# Patient Record
Sex: FEMALE | Race: WHITE | Employment: PART TIME | URBAN - METROPOLITAN AREA
[De-identification: names, ages, dates, MRNs, and addresses within clinical notes are randomized per-mention and may not be internally consistent; named-entity substitution may affect disease eponyms.]

---

## 2020-03-11 ENCOUNTER — APPOINTMENT (OUTPATIENT)
Dept: CT IMAGING | Age: 60
End: 2020-03-11
Payer: COMMERCIAL

## 2020-03-11 ENCOUNTER — HOSPITAL ENCOUNTER (EMERGENCY)
Age: 60
Discharge: HOME | End: 2020-03-11
Attending: EMERGENCY MEDICINE
Payer: COMMERCIAL

## 2020-03-11 ENCOUNTER — APPOINTMENT (OUTPATIENT)
Dept: GENERAL RADIOLOGY | Age: 60
End: 2020-03-11
Payer: COMMERCIAL

## 2020-03-11 VITALS
BODY MASS INDEX: 35.09 KG/M2 | RESPIRATION RATE: 18 BRPM | HEART RATE: 88 BPM | SYSTOLIC BLOOD PRESSURE: 117 MMHG | TEMPERATURE: 98.6 F | DIASTOLIC BLOOD PRESSURE: 63 MMHG | HEIGHT: 66 IN | OXYGEN SATURATION: 99 %

## 2020-03-11 PROCEDURE — 72100 X-RAY EXAM L-S SPINE 2/3 VWS: CPT

## 2020-03-11 PROCEDURE — 93005 ELECTROCARDIOGRAM TRACING: CPT | Performed by: PHYSICIAN ASSISTANT

## 2020-03-11 PROCEDURE — 72125 CT NECK SPINE W/O DYE: CPT

## 2020-03-11 PROCEDURE — 99284 EMERGENCY DEPT VISIT MOD MDM: CPT

## 2020-03-11 ASSESSMENT — ENCOUNTER SYMPTOMS
ABDOMINAL PAIN: 0
SHORTNESS OF BREATH: 0
NAUSEA: 0
VOMITING: 0
CHEST TIGHTNESS: 0
BACK PAIN: 1

## 2020-03-11 ASSESSMENT — PAIN DESCRIPTION - PAIN TYPE: TYPE: ACUTE PAIN

## 2020-03-11 ASSESSMENT — PAIN SCALES - GENERAL: PAINLEVEL_OUTOF10: 8

## 2020-03-11 ASSESSMENT — PAIN DESCRIPTION - LOCATION: LOCATION: NECK
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
## 2020-03-11 NOTE — ED NOTES
Patient prepared for and ready to be discharged. Patient discharged at this time in no acute distress after verbalizing understanding of discharge instructions. Patient left after receiving After Visit Summary instructions.         Leticia Diana RN  03/11/20 4088

## 2020-03-12 LAB
EKG ATRIAL RATE: 76 BPM
EKG DIAGNOSIS: NORMAL
EKG P AXIS: 75 DEGREES
EKG P-R INTERVAL: 136 MS
EKG Q-T INTERVAL: 400 MS
EKG QRS DURATION: 82 MS
EKG QTC CALCULATION (BAZETT): 450 MS
EKG R AXIS: 14 DEGREES
EKG T AXIS: 30 DEGREES
EKG VENTRICULAR RATE: 76 BPM

## 2021-03-05 ENCOUNTER — APPOINTMENT (OUTPATIENT)
Dept: CT IMAGING | Age: 61
End: 2021-03-05
Payer: COMMERCIAL

## 2021-03-05 ENCOUNTER — HOSPITAL ENCOUNTER (EMERGENCY)
Age: 61
Discharge: HOME | End: 2021-03-05
Attending: EMERGENCY MEDICINE
Payer: COMMERCIAL

## 2021-03-05 VITALS
RESPIRATION RATE: 14 BRPM | TEMPERATURE: 97.1 F | OXYGEN SATURATION: 96 % | SYSTOLIC BLOOD PRESSURE: 159 MMHG | DIASTOLIC BLOOD PRESSURE: 97 MMHG | WEIGHT: 218.92 LBS | HEART RATE: 71 BPM | BODY MASS INDEX: 35.33 KG/M2

## 2021-03-05 DIAGNOSIS — S16.1XXA STRAIN OF NECK MUSCLE, INITIAL ENCOUNTER: ICD-10-CM

## 2021-03-05 DIAGNOSIS — R03.0 ELEVATED BLOOD PRESSURE READING: ICD-10-CM
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
## 2021-03-05 DIAGNOSIS — S39.012A STRAIN OF LUMBAR REGION, INITIAL ENCOUNTER: ICD-10-CM

## 2021-03-05 PROCEDURE — 72125 CT NECK SPINE W/O DYE: CPT

## 2021-03-05 PROCEDURE — 99283 EMERGENCY DEPT VISIT LOW MDM: CPT

## 2021-03-05 PROCEDURE — 70450 CT HEAD/BRAIN W/O DYE: CPT

## 2021-03-05 RX ORDER — IBUPROFEN 600 MG/1
600 TABLET ORAL EVERY 6 HOURS PRN
Qty: 30 TABLET | Refills: 0 | Status: SHIPPED | OUTPATIENT
Start: 2021-03-05 | End: 2021-08-30

## 2021-03-05 RX ORDER — ACETAMINOPHEN 500 MG
500 TABLET ORAL EVERY 6 HOURS PRN
Qty: 30 TABLET | Refills: 0 | Status: SHIPPED | OUTPATIENT
Start: 2021-03-05 | End: 2021-08-30

## 2021-03-05 ASSESSMENT — PAIN SCALES - GENERAL
PAINLEVEL_OUTOF10: 8
PAINLEVEL_OUTOF10: 6

## 2021-03-05 ASSESSMENT — PAIN DESCRIPTION - LOCATION: LOCATION: HEAD;NECK;BACK

## 2021-03-05 ASSESSMENT — ENCOUNTER SYMPTOMS
RHINORRHEA: 0
EYE REDNESS: 0
BACK PAIN: 1
SORE THROAT: 0
SHORTNESS OF BREATH: 0
ABDOMINAL PAIN: 0

## 2021-03-05 ASSESSMENT — PAIN DESCRIPTION - DESCRIPTORS: DESCRIPTORS: ACHING;SORE
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
## 2021-08-30 ENCOUNTER — APPOINTMENT (OUTPATIENT)
Dept: GENERAL RADIOLOGY | Age: 61
End: 2021-08-30
Payer: COMMERCIAL

## 2021-08-30 ENCOUNTER — APPOINTMENT (OUTPATIENT)
Dept: CT IMAGING | Age: 61
End: 2021-08-30
Payer: COMMERCIAL

## 2021-08-30 ENCOUNTER — HOSPITAL ENCOUNTER (EMERGENCY)
Age: 61
Discharge: HOME | End: 2021-08-30
Payer: COMMERCIAL

## 2021-08-30 VITALS
RESPIRATION RATE: 14 BRPM | TEMPERATURE: 97.8 F | WEIGHT: 223.99 LBS | OXYGEN SATURATION: 98 % | SYSTOLIC BLOOD PRESSURE: 124 MMHG | HEART RATE: 70 BPM | HEIGHT: 67 IN | BODY MASS INDEX: 35.16 KG/M2 | DIASTOLIC BLOOD PRESSURE: 46 MMHG

## 2021-08-30 DIAGNOSIS — R42 VERTIGO: Primary | ICD-10-CM

## 2021-08-30 LAB
A/G RATIO: 1.6 (ref 1.1–2.2)
ALBUMIN SERPL-MCNC: 4.7 G/DL (ref 3.4–5)
ALP BLD-CCNC: 124 U/L (ref 40–129)
ALT SERPL-CCNC: 11 U/L (ref 10–40)
ANION GAP SERPL CALCULATED.3IONS-SCNC: 13 MMOL/L (ref 3–16)
APTT: 34.6 SEC (ref 26.2–38.6)
AST SERPL-CCNC: 15 U/L (ref 15–37)
BASOPHILS ABSOLUTE: 0.1 K/UL (ref 0–0.2)
BASOPHILS RELATIVE PERCENT: 0.8 %
BILIRUB SERPL-MCNC: 0.7 MG/DL (ref 0–1)
BILIRUBIN URINE: NEGATIVE
BLOOD, URINE: NEGATIVE
BUN BLDV-MCNC: 10 MG/DL (ref 7–20)
CALCIUM SERPL-MCNC: 10 MG/DL (ref 8.3–10.6)
CHLORIDE BLD-SCNC: 101 MMOL/L (ref 99–110)
CLARITY: CLEAR
CO2: 26 MMOL/L (ref 21–32)
COLOR: YELLOW
CREAT SERPL-MCNC: 0.5 MG/DL (ref 0.6–1.2)
EKG ATRIAL RATE: 68 BPM
EKG DIAGNOSIS: NORMAL
EKG P AXIS: 75 DEGREES
EKG P-R INTERVAL: 120 MS
EKG Q-T INTERVAL: 456 MS
EKG QRS DURATION: 84 MS
EKG QTC CALCULATION (BAZETT): 484 MS
EKG R AXIS: 18 DEGREES
EKG T AXIS: 56 DEGREES
EKG VENTRICULAR RATE: 68 BPM
EOSINOPHILS ABSOLUTE: 0 K/UL (ref 0–0.6)
EOSINOPHILS RELATIVE PERCENT: 0.2 %
GFR AFRICAN AMERICAN: >60
GFR NON-AFRICAN AMERICAN: >60
GLOBULIN: 2.9 G/DL
GLUCOSE BLD-MCNC: 121 MG/DL (ref 70–99)
GLUCOSE BLD-MCNC: 130 MG/DL (ref 70–99)
GLUCOSE URINE: NEGATIVE MG/DL
HCT VFR BLD CALC: 41.3 % (ref 36–48)
HEMOGLOBIN: 14.4 G/DL (ref 12–16)
INR BLD: 0.98 (ref 0.88–1.12)
KETONES, URINE: NEGATIVE MG/DL
LEUKOCYTE ESTERASE, URINE: NEGATIVE
LYMPHOCYTES ABSOLUTE: 1.2 K/UL (ref 1–5.1)
LYMPHOCYTES RELATIVE PERCENT: 11.3 %
MCH RBC QN AUTO: 30.4 PG (ref 26–34)
MCHC RBC AUTO-ENTMCNC: 34.7 G/DL (ref 31–36)
MCV RBC AUTO: 87.6 FL (ref 80–100)
MICROSCOPIC EXAMINATION: NORMAL
MONOCYTES ABSOLUTE: 0.2 K/UL (ref 0–1.3)
MONOCYTES RELATIVE PERCENT: 1.9 %
NEUTROPHILS ABSOLUTE: 9.3 K/UL (ref 1.7–7.7)
NEUTROPHILS RELATIVE PERCENT: 85.8 %
NITRITE, URINE: NEGATIVE
PDW BLD-RTO: 12.7 % (ref 12.4–15.4)
PERFORMED ON: ABNORMAL
PH UA: 7 (ref 5–8)
PLATELET # BLD: 314 K/UL (ref 135–450)
PMV BLD AUTO: 7.2 FL (ref 5–10.5)
POTASSIUM REFLEX MAGNESIUM: 3.9 MMOL/L (ref 3.5–5.1)
PROTEIN UA: NEGATIVE MG/DL
PROTHROMBIN TIME: 11.1 SEC (ref 9.9–12.7)
RBC # BLD: 4.72 M/UL (ref 4–5.2)
SODIUM BLD-SCNC: 140 MMOL/L (ref 136–145)
SPECIFIC GRAVITY UA: >1.03 (ref 1–1.03)
TOTAL PROTEIN: 7.6 G/DL (ref 6.4–8.2)
TROPONIN: <0.01 NG/ML
URINE REFLEX TO CULTURE: NORMAL
URINE TYPE: NORMAL
UROBILINOGEN, URINE: 0.2 E.U./DL
WBC # BLD: 10.8 K/UL (ref 4–11)

## 2021-08-30 PROCEDURE — 81003 URINALYSIS AUTO W/O SCOPE: CPT

## 2021-08-30 PROCEDURE — 6360000004 HC RX CONTRAST MEDICATION: Performed by: NURSE PRACTITIONER

## 2021-08-30 PROCEDURE — 96366 THER/PROPH/DIAG IV INF ADDON: CPT

## 2021-08-30 PROCEDURE — 36415 COLL VENOUS BLD VENIPUNCTURE: CPT

## 2021-08-30 PROCEDURE — 70496 CT ANGIOGRAPHY HEAD: CPT

## 2021-08-30 PROCEDURE — 6360000002 HC RX W HCPCS: Performed by: NURSE PRACTITIONER

## 2021-08-30 PROCEDURE — 84484 ASSAY OF TROPONIN QUANT: CPT

## 2021-08-30 PROCEDURE — 71045 X-RAY EXAM CHEST 1 VIEW: CPT

## 2021-08-30 PROCEDURE — 70450 CT HEAD/BRAIN W/O DYE: CPT

## 2021-08-30 PROCEDURE — 85610 PROTHROMBIN TIME: CPT

## 2021-08-30 PROCEDURE — 96365 THER/PROPH/DIAG IV INF INIT: CPT

## 2021-08-30 PROCEDURE — 6370000000 HC RX 637 (ALT 250 FOR IP): Performed by: NURSE PRACTITIONER

## 2021-08-30 PROCEDURE — 93010 ELECTROCARDIOGRAM REPORT: CPT | Performed by: INTERNAL MEDICINE

## 2021-08-30 PROCEDURE — 99283 EMERGENCY DEPT VISIT LOW MDM: CPT

## 2021-08-30 PROCEDURE — 85025 COMPLETE CBC W/AUTO DIFF WBC: CPT

## 2021-08-30 PROCEDURE — 93005 ELECTROCARDIOGRAM TRACING: CPT | Performed by: NURSE PRACTITIONER

## 2021-08-30 PROCEDURE — 85730 THROMBOPLASTIN TIME PARTIAL: CPT

## 2021-08-30 PROCEDURE — 96361 HYDRATE IV INFUSION ADD-ON: CPT

## 2021-08-30 PROCEDURE — 80053 COMPREHEN METABOLIC PANEL: CPT

## 2021-08-30 PROCEDURE — 2580000003 HC RX 258: Performed by: NURSE PRACTITIONER

## 2021-08-30 RX ORDER — MECLIZINE HCL 12.5 MG/1
25 TABLET ORAL ONCE
Status: COMPLETED | OUTPATIENT
Start: 2021-08-30 | End: 2021-08-30

## 2021-08-30 RX ORDER — LEVOTHYROXINE SODIUM 0.1 MG/1
100 TABLET ORAL DAILY
COMMUNITY

## 2021-08-30 RX ORDER — FLUTICASONE PROPIONATE 50 MCG
1 SPRAY, SUSPENSION (ML) NASAL DAILY PRN
COMMUNITY

## 2021-08-30 RX ORDER — 0.9 % SODIUM CHLORIDE 0.9 %
1000 INTRAVENOUS SOLUTION INTRAVENOUS ONCE
Status: COMPLETED | OUTPATIENT
Start: 2021-08-30 | End: 2021-08-30

## 2021-08-30 RX ORDER — MECLIZINE HYDROCHLORIDE 25 MG/1
25 TABLET ORAL 3 TIMES DAILY PRN
Qty: 30 TABLET | Refills: 0 | Status: SHIPPED | OUTPATIENT
Start: 2021-08-30 | End: 2021-09-09

## 2021-08-30 RX ORDER — CETIRIZINE HYDROCHLORIDE 10 MG/1
10 TABLET ORAL DAILY PRN
COMMUNITY

## 2021-08-30 RX ORDER — ATORVASTATIN CALCIUM 20 MG/1
20 TABLET, FILM COATED ORAL DAILY
COMMUNITY

## 2021-08-30 RX ORDER — PROMETHAZINE HYDROCHLORIDE 12.5 MG/1
12.5 TABLET ORAL 3 TIMES DAILY PRN
Qty: 12 TABLET | Refills: 0 | Status: SHIPPED | OUTPATIENT
Start: 2021-08-30 | End: 2021-09-06

## 2021-08-30 RX ORDER — LISINOPRIL AND HYDROCHLOROTHIAZIDE 20; 12.5 MG/1; MG/1
0.5 TABLET ORAL DAILY
COMMUNITY

## 2021-08-30 RX ADMIN — MECLIZINE 25 MG: 12.5 TABLET ORAL at 11:48

## 2021-08-30 RX ADMIN — IOPAMIDOL 75 ML: 755 INJECTION, SOLUTION INTRAVENOUS at 11:40

## 2021-08-30 RX ADMIN — Medication 12.5 MG: at 13:38

## 2021-08-30 RX ADMIN — SODIUM CHLORIDE 1000 ML: 9 INJECTION, SOLUTION INTRAVENOUS at 11:48

## 2021-08-30 RX ADMIN — SODIUM CHLORIDE 1000 ML: 9 INJECTION, SOLUTION INTRAVENOUS at 12:43

## 2021-08-30 RX ADMIN — Medication 12.5 MG: at 11:45

## 2021-08-30 ASSESSMENT — PAIN SCALES - GENERAL: PAINLEVEL_OUTOF10: 0

## 2021-08-30 NOTE — ED NOTES
Bed: A-15  Expected date:   Expected time:   Means of arrival:   Comments:  Pam 60 y/o sweating     Tera Wang RN  08/30/21 9789
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
## 2021-08-30 NOTE — ED PROVIDER NOTES
Attending Supervisory Note/Shared Visit   I have personally performed a face to face diagnostic evaluation on this patient. I have reviewed the mid-levels findings and agree. History and Exam by me shows alert white female no acute distress. Complaining of a spinning sensation with nausea. She went to bed at 10 PM last night. She woke up at 5 AM and rolled over and had a sensation that she was spinning and was nauseous. She fell back asleep and woke up again and again rolled over and had a sensation that she was spinning and felt extremely nauseous and yelled for her  and told her that she was going to get sick. She denies headache. No blurred vision or double vision. She feels diffusely weak but no focal weakness. No visual symptoms. No speech difficulty. No headache. HEENT: Atraumatic. Pupils equal round reactive. Extraocular movements are intact. No nystagmus. No facial asymmetry. Heart: Regular rate and rhythm. No murmurs or gallops noted. Lungs: Breath sounds equal bilaterally and clear. Neuro: Awake, alert, oriented. Symmetrical reactive pupils. Intact extraocular movements. No facial asymmetry. Symmetrical motor function. No drift. No limb ataxia. Intact sensation to touch. She was ambulated and exhibited no ataxia. EKG: Sinus rhythm, rate of 68, nonspecific ST-T wave changes. Prolonged QT. Rhythm strip shows a sinus rhythm with a rate of 68, IN interval 120 ms, QRS of 84 ms with a QTC of 484 ms with no other ectopy as interpreted by me. This compared to an EKG dated 3/11/2020, the nonspecific T wave flattening is new. The prolonged QT is new. Code stroke was activated.       (Please note that portions of this note were completed with a voice recognition program.  Efforts were made to edit the dictations but occasionally words are mis-transcribed.)    Yolande Butler MD  Attending Emergency Physician        Gilbert Gregg MD  08/30/21 3468

## 2021-08-30 NOTE — ED NOTES
Pt states that when she turned her head she became dizzy,   RN ambulated pt to the restroom when she turned  To get in the bed pt states she started to feel the spinning .    Stevie Ramires NP aware      Promise Villa RN  08/30/21 [address] Barry Israel RN  08/30/21 4549

## 2021-08-30 NOTE — ED NOTES
Pt states that she was turning from her back to her left side over night she the room began spinning, she laid back on her back and spinning went away , she reports she did this again at 6am and this time the spinning was worse and caused her to become nauseated  And diaphoretic.  She states yelled for her  and needed him to get her a wash cloth, she states that has since resolved and she feels weak everywhere, her gait is steady no s.s of distress noted      Khadar Mendoza RN  08/30/21 3238

## 2021-08-30 NOTE — ED NOTES
Pt walked around the department without feeling dizzy or the room spinning      Shauna Harden RN  08/30/21 2114

## 2021-08-30 NOTE — ED PROVIDER NOTES
[address]  [address] [address]  Dept: [phone]  Loc: [address] [facility] [place] COMPLAINT    Chief Complaint   Patient presents with    Dizziness       HPI    Laurie Chase is a 61 y.o. female who presents to the emergency department with complaints of dizziness at 5 AM after she rolled from her back to her side during her sleep. She states she rolled back over onto her back and the dizziness subsided. This happened again when she rolled to the other side from her back at 6 AM.  This time the dizziness was significant. She broke out into a diaphoretic episode and began vomiting. She did not feel well. She denied any changes in vision such as blurred vision or diplopia. She denied chest pain or shortness of breath. She has a history of vertigo that was treated 1 time many years ago. She has never had problems since. She currently denies dizziness. She denies any recent illnesses or exceptional stress levels. She denies weakness, numbness or paresthesias. Dizziness only occurs when she rolls to her sides. It does not occur with other position changes or with ambulation. REVIEW OF SYSTEMS    Neuro: see HPI, no LOC, no headache, + dizziness, no double vision  Cardiac: No chest pain or palpitations  Respiratory: No shortness of breath or new cough  General: No fevers or chills  : No dysuria or hematuria  GI: No vomiting or diarrhea  See HPI for further details. All other systems reviewed and are negative.     PAST MEDICAL OR SURGICAL HISTORY    Past Medical History:   Diagnosis Date    Hyperlipidemia     Hypertension     Ovary absent     Patient did not have ovary removal but reports she was told that both are absent     Thyroid disease      Past Surgical History:   Procedure Laterality Date     SECTION      x 3     DILATION AND CURETTAGE OF UTERUS CURRENT MEDICATIONS    Current Outpatient Rx   Medication Sig Dispense Refill    atorvastatin (LIPITOR) 20 MG tablet Take 20 mg by mouth daily      levothyroxine (SYNTHROID) 100 MCG tablet Take 100 mcg by mouth Daily      lisinopril-hydroCHLOROthiazide (ZESTORETIC) 20-12.5 MG per tablet Take 0.5 tablets by mouth daily      fluticasone (FLONASE) 50 MCG/ACT nasal spray 1 spray by Each Nostril route daily as needed for Rhinitis      cetirizine (ZYRTEC) 10 MG tablet Take 10 mg by mouth daily as needed for Allergies      meclizine (ANTIVERT) 25 MG tablet Take 1 tablet by mouth 3 times daily as needed for Dizziness or Nausea 30 tablet 0    promethazine (PHENERGAN) 12.5 MG tablet Take 1 tablet by mouth 3 times daily as needed for Nausea 12 tablet 0       ALLERGIES    Allergies   Allergen Reactions    Medroxyprogesterone Acetate Other (See Comments)    Permethrin      Other reaction(s): Cough       FAMILY OR SOCIAL HISTORY    History reviewed. No pertinent family history. Social History     Socioeconomic History    Marital status:      Spouse name: None    Number of children: None    Years of education: None    Highest education level: None   Occupational History    None   Tobacco Use    Smoking status: Never Smoker    Smokeless tobacco: Never Used   Substance and Sexual Activity    Alcohol use: Yes     Comment: rarelt     Drug use: No    Sexual activity: None   Other Topics Concern    None   Social History Narrative    None     Social Determinants of Health     Financial Resource Strain:     Difficulty of Paying Living Expenses:    Food Insecurity:     Worried About Running Out of Food in the Last Year:     Ran Out of Food in the Last Year:    Transportation Needs:     Lack of Transportation (Medical):      Lack of Transportation (Non-Medical):    Physical Activity:     Days of Exercise per Week:     Minutes of Exercise per Session:    Stress:     Feeling of Stress :    Social Connections:     Frequency of Communication with Friends and Family:     Frequency of Social Gatherings with Friends and Family:     Attends Sikh Services:     Active Member of Clubs or Organizations:     Attends Club or Organization Meetings:     Marital Status:    Intimate Partner Violence:     Fear of Current or Ex-Partner:     Emotionally Abused:     Physically Abused:     Sexually Abused:        PHYSICAL EXAM    VITAL SIGNS: BP (!) 124/46   Pulse 70   Temp 97.8 °F (36.6 °C) (Oral)   Resp 14   Ht 5' 6.5\" (1.689 m)   Wt 223 lb 15.8 oz (101.6 kg)   SpO2 98%   BMI 35.61 kg/m²   Constitutional:  Well developed, well nourished, no acute distress  Eyes:  Pupils equally round and reactive to light accommodation. Pupils are 4 mm equal bilaterally. EOMs are intact. No nystagmus noted. No gaze palsy. Negative test of skew. HENT:  External ears normal, nose normal, oropharynx moist  Neck: Normal range of motion, no tenderness  Respiratory: Lungs clear to auscultation bilaterally, no respiratory distress, no wheezing   Cardiovascular:  Regular rate, regular rhythm, no murmurs   GI:  Soft, nondistended, normal bowel sounds, nontender  Musculoskeletal:  No edema, no acute deformities   Integument:  Skin is warm and dry, no obvious rash    Neurologic:  Awake, alert, oriented x4, no aphasia, no slurred speech, CN II-XII intact, normal finger to nose test bilaterally, 5/5 strength in all 4 extremities, no arm motor drift, no leg motor drift, sensation to light touch intact bilaterally, patellar reflexes 2+ and equal bilaterally NIH stroke scale score is 0 initially. GCS of 15. We ambulated patient. She has a normal steady gait. No truncal ataxia. She is able to perform rapid alternating movements without difficulty. Vascular: Radial and DP pulses 2+ and equal bilaterally    EKG     Twelve-lead EKG reviewed by myself and interpreted by my attending physician Dr. Thomas Lainez.   Ventricular rate 60 bpm, WY interval 120 ms, QRS duration 84 ms, QTC 44 ms  No ST elevation or depression. Interpretation is normal sinus rhythm. Today's tracing was compared to the one on March 11, 2020 with no significant changes noted. PROCEDURE:  I performed the Prabhjot-Hallpike maneuver on this patient. She tolerated the procedure well. The procedure was effective. RADIOLOGY/PROCEDURES    XR CHEST PORTABLE   Final Result   No active cardiopulmonary disease         CTA HEAD NECK W CONTRAST   Final Result   Unremarkable CTA of the head and neck. CT HEAD WO CONTRAST   Final Result   No acute intracranial abnormality. Findings were discussed with Zheng Pierre at 11:56 am on 8/30/2021.            Labs Reviewed   CBC WITH AUTO DIFFERENTIAL - Abnormal; Notable for the following components:       Result Value    Neutrophils Absolute 9.3 (*)     All other components within normal limits    Narrative:     Performed at:  [place] [address]   Phone [phone]   COMPREHENSIVE METABOLIC PANEL W/ REFLEX TO MG FOR LOW K - Abnormal; Notable for the following components:    Glucose 130 (*)     CREATININE 0.5 (*)     All other components within normal limits    Narrative:     Performed at:  [place] [address]   Phone [phone]   POCT GLUCOSE - Abnormal; Notable for the following components:    POC Glucose 121 (*)     All other components within normal limits    Narrative:     Performed at:  [place] [address]   Phone [phone]   TROPONIN    Narrative:     Performed at:  [facility]  [address] [place] [address]   Phone [phone]   PROTIME-INR    Narrative:     Performed at:  [facility]  [address] [place] [address] Phone [phone]   APTT    Narrative:     Performed at:  [facility]  [address] [place] [facility] [address]   Phone [phone]   URINE RT REFLEX TO CULTURE    Narrative:     Performed at:  [facility]  [address] [place] [facility] [address]   Phone [phone]   POCT GLUCOSE   POCT GLUCOSE       ED COURSE & MEDICAL DECISION MAKING    Pertinent Labs & Imaging studies reviewed and interpreted. (See chart for details)    See chart for details of medications given during the ED stay. Vitals:    08/30/21 1340 08/30/21 1354 08/30/21 1424 08/30/21 1439   BP: (!) 119/48 (!) 100/37 (!) 102/51 (!) 124/46   Pulse: 78 69 71 70   Resp: 18 13 13 14   Temp:       TempSrc:       SpO2:   98%    Weight:       Height:         Medications   0.9 % sodium chloride bolus (0 mLs IntraVENous Stopped 8/30/21 1243)   meclizine (ANTIVERT) tablet 25 mg (25 mg Oral Given 8/30/21 1148)   promethazine (PHENERGAN) 12.5mg in sodium chloride 0.9% 50 mL IVPB 12.5 mg (0 mg IntraVENous Stopped 8/30/21 1232)   iopamidol (ISOVUE-370) 76 % injection 75 mL (75 mLs IntraVENous Given 8/30/21 1140)   0.9 % sodium chloride bolus (0 mLs IntraVENous Stopped 8/30/21 1441)   promethazine (PHENERGAN) 12.5mg in sodium chloride 0.9% 50 mL IVPB 12.5 mg (0 mg IntraVENous Stopped 8/30/21 1440)     This patient was seen evaluated by myself my attending physician Dr. Maeve Terrell. Differential diagnosis includes was not limited to thrombotic stroke, embolic stroke, hemorrhagic stroke, TIA,  hypoglycemia, mass lesions, BPPV, other. She is nontoxic in appearance and hemodynamically stable. Based on her presentation there was a concern that she could possibly have posterior circulation stroke although her neuro exam was completely unremarkable. And she had an initial NIH stroke scale score of 0.   Without better safety call code stroke and to run her story passed the stroke team.    1130 spoke with someone from the  stroke team who said he was covering for Dr. Duane Fendt. They will follow the radiology.  spoke with Dr. Lizette Figueroa from radiology. CT head without changed and unremarkable. CTA of the head and neck is interpreted by radiology and reviewed by myself show an unremarkable scan. CBC is unremarkable. Chemistry panel is unremarkable with a glucose of 130. Troponin less than 0.01. Urine is unremarkable for infection. Test x-ray is interpreted by radiology reviewed by myself showed no acute cardiopulmonary process. The patient was given fluids, Antivert and Phenergan. On reevaluation she states she is feeling little bit better but she felt lightheaded. She has not eaten since yesterday. We gave her something to eat and drink. I gave her another liter of fluids. We got her up and walked her and she complained of room spinning sensation. Her back in bed gave her Phenergan. I also performed the Prabhjot-Hallpike maneuver which she was very symptomatic of at first but then her symptoms resolved thereafter. She wants to go home. She is ambulating in the department without difficulty. She has no more dizziness or room spinning sensations with any kind of position changes. She has a PCP that she can follow-up with. We gave her neuro to follow-up with as well. I will send her home with meclizine and Antivert and instructions to return to the emergency department immediately for worsening symptoms. Patient verbalized understanding of the discharge instructions. CRITICAL CARE NOTE:  There was a high probability of clinically significant life-threatening deterioration of the patient's condition requiring my urgent intervention. Total critical care time is 45 minutes.     This includes multiple reevaluations, vital sign monitoring, pulse oximetry monitoring, telemetry monitoring, clinical response to the IV medications, reviewing the nursing notes, consultation time,

## 2025-02-10 ENCOUNTER — HOSPITAL ENCOUNTER (EMERGENCY)
Age: 65
Discharge: HOME | End: 2025-02-10
Payer: COMMERCIAL

## 2025-02-10 VITALS
SYSTOLIC BLOOD PRESSURE: 163 MMHG | WEIGHT: 217.37 LBS | RESPIRATION RATE: 20 BRPM | HEIGHT: 66 IN | DIASTOLIC BLOOD PRESSURE: 82 MMHG | HEART RATE: 76 BPM | BODY MASS INDEX: 34.93 KG/M2 | TEMPERATURE: 97.9 F | OXYGEN SATURATION: 99 %

## 2025-02-10 DIAGNOSIS — H65.91 MIDDLE EAR EFFUSION, RIGHT: ICD-10-CM

## 2025-02-10 DIAGNOSIS — R22.0 RIGHT FACIAL SWELLING: Primary | ICD-10-CM

## 2025-02-10 PROCEDURE — 99283 EMERGENCY DEPT VISIT LOW MDM: CPT

## 2025-02-10 RX ORDER — FLUTICASONE PROPIONATE 50 MCG
2 SPRAY, SUSPENSION (ML) NASAL DAILY
Qty: 16 G | Refills: 0 | Status: SHIPPED | OUTPATIENT
Start: 2025-02-10

## 2025-02-10 ASSESSMENT — PAIN DESCRIPTION - FREQUENCY: FREQUENCY: CONTINUOUS

## 2025-02-10 ASSESSMENT — PAIN SCALES - GENERAL
PAINLEVEL_OUTOF10: 4
PAINLEVEL_OUTOF10: 4

## 2025-02-10 ASSESSMENT — PAIN DESCRIPTION - DESCRIPTORS: DESCRIPTORS: DISCOMFORT

## 2025-02-10 ASSESSMENT — PAIN DESCRIPTION - LOCATION: LOCATION: JAW

## 2025-02-10 ASSESSMENT — PAIN DESCRIPTION - PAIN TYPE: TYPE: ACUTE PAIN

## 2025-02-10 NOTE — DISCHARGE INSTRUCTIONS
Home in stable condition to use the Flonase as discussed, we recommend soft foods and no large bites for couple of days, consider sucking on sugar-free sour cough drops because of the mild tenderness in the parotid salivary gland on the right, and stay well-hydrated.  Monitor for gradual improvement.  Consider follow-up with your primary care medical provider in a couple of days as needed.  Return to the ER for any emergency worsening or concern.

## 2025-02-10 NOTE — ED PROVIDER NOTES
**ADVANCED PRACTICE PROVIDER, I HAVE EVALUATED THIS PATIENT**        [facility] EMERGENCY DEPARTMENT  EMERGENCY DEPARTMENT ENCOUNTER      Pt Name: Yun Bee  MRN:[id number]  Birthdate 1960  Date of evaluation: 2/10/2025  Provider: Coy Dale PA-C  Note Started: 3:40 PM EST 2/10/25        Chief Complaint:    Chief Complaint   Patient presents with    Jaw Pain         Nursing Notes, Past Medical Hx, Past Surgical Hx, Social Hx, Allergies, and Family Hx were all reviewed and agreed with or any disagreements were addressed in the HPI.    HPI: (Location, Duration, Timing, Severity, Quality, Assoc Sx, Context, Modifying factors)    History From: patient          Chief Complaint of tenderness near right jaw    This is a  64 y.o. female who presents stating that a couple of hours ago she was at home eating some crunchy brownie bites and started having something very weird occur.  States that she felt like there was a swelling in the right cheek or jaw or under the right jaw that got quite prominent, also felt like she was having a lot of tightness and tenderness in the right jaw muscle show that she could not keep chewing.  She states this was a very uncomfortable sensation and she was not certain what to do with it.  States that it did cause her to panic a little bit.  She states however that that as she has gotten to the ER and has been waiting that the area of swelling did totally resolve as well as her feeling of panic.  Now she is just in wonder and trying to figure out what happened.  No respiratory symptoms.  Additionally patient mentions that she has had a recent cold but that is getting better.  Also has had some recent scalp tenderness on the right but that is better.  Did have some vertigo yesterday but that is consistent with her previous experiences of the same and is not having any today.  No other acute complaint at this time.  No difficulty breathing or swallowing or chewing.  No

## 2025-02-10 NOTE — ED TRIAGE NOTES
Patient to the ER with complaints of right sided jaw swelling/ a knot and tightness that started while she was eating brownie brittle.  She says the knot has resolved along with the swelling but she still feels a slight discomfort in the area.     Alert and oriented x4 and ambulatory with a steady gait at time of triage.